# Patient Record
Sex: FEMALE | Race: WHITE
[De-identification: names, ages, dates, MRNs, and addresses within clinical notes are randomized per-mention and may not be internally consistent; named-entity substitution may affect disease eponyms.]

---

## 2019-06-17 ENCOUNTER — TRANSCRIPTION ENCOUNTER (OUTPATIENT)
Age: 69
End: 2019-06-17

## 2020-11-18 ENCOUNTER — IMPORTED ENCOUNTER (OUTPATIENT)
Dept: URBAN - METROPOLITAN AREA CLINIC 50 | Facility: CLINIC | Age: 70
End: 2020-11-18

## 2021-01-21 ENCOUNTER — IMPORTED ENCOUNTER (OUTPATIENT)
Dept: URBAN - METROPOLITAN AREA CLINIC 50 | Facility: CLINIC | Age: 71
End: 2021-01-21

## 2021-04-17 ASSESSMENT — VISUAL ACUITY
OS_PH: 20/30-2
OD_SC: 20/40-1/+1
OS_SC: 20/40+1
OD_CC: J1+
OD_BAT: 20/30-
OD_OTHER: 20/30-. 20/60.
OS_CC: J1+
OS_BAT: 20/25-

## 2021-04-17 ASSESSMENT — TONOMETRY
OS_IOP_MMHG: 14
OD_IOP_MMHG: 17
OS_IOP_MMHG: 15
OD_IOP_MMHG: 14

## 2021-05-10 ENCOUNTER — PREPPED CHART (OUTPATIENT)
Dept: URBAN - METROPOLITAN AREA CLINIC 49 | Facility: CLINIC | Age: 71
End: 2021-05-10

## 2021-05-10 NOTE — PATIENT DISCUSSION
"""Discussed dry eye diagnosis with patient. Educated patient on proper lid hygiene and stressed importance of lid massages and the use of warm compresses and artificial tears. ""."

## 2021-05-12 ENCOUNTER — CONSULTATION (OUTPATIENT)
Dept: URBAN - METROPOLITAN AREA CLINIC 49 | Facility: CLINIC | Age: 71
End: 2021-05-12

## 2021-05-12 DIAGNOSIS — D48.5: ICD-10-CM

## 2021-05-12 PROCEDURE — 92012 INTRM OPH EXAM EST PATIENT: CPT

## 2021-05-12 PROCEDURE — 92285 EXTERNAL OCULAR PHOTOGRAPHY: CPT

## 2021-05-12 ASSESSMENT — VISUAL ACUITY
OS_SC: 20/30-2
OD_SC: 20/30-2

## 2021-05-12 NOTE — PATIENT DISCUSSION
D/w patient she does not have to have the lesion excised, gave the patient the following options, monitor lesion or excise in office.

## 2021-05-12 NOTE — PATIENT DISCUSSION
D/w patient lower lid bleph in office for $3,500. Also discussed that she would benefit more from a face lift or cheek lift.

## 2021-05-12 NOTE — PATIENT DISCUSSION
Patient states the lesion becomes bothersome and irritated. D/w patient we can excise in office and we would send lesion to pathology to rule out any cancer. Also d/w patient that she will have a scar secondary to cutting into skin to excise lesion.

## 2021-05-12 NOTE — PATIENT DISCUSSION
Patient prefers to not move forward with excision secondary to possible scarring. Recommend patient call office if lesion grows, bleeds or becomes more bothersome.